# Patient Record
Sex: FEMALE | URBAN - METROPOLITAN AREA
[De-identification: names, ages, dates, MRNs, and addresses within clinical notes are randomized per-mention and may not be internally consistent; named-entity substitution may affect disease eponyms.]

---

## 2022-12-20 ENCOUNTER — OFFICE VISIT (OUTPATIENT)
Facility: LOCATION | Age: 67
End: 2022-12-20
Payer: COMMERCIAL

## 2022-12-20 DIAGNOSIS — H25.813 COMBINED FORMS OF AGE-RELATED CATARACT, BILATERAL: ICD-10-CM

## 2022-12-20 DIAGNOSIS — E11.9 TYPE 2 DIABETES MELLITUS W/O COMPLICATION: Primary | ICD-10-CM

## 2022-12-20 DIAGNOSIS — H16.223 KERATOCONJUNCTIVITIS SICCA, BILATERAL: ICD-10-CM

## 2022-12-20 PROCEDURE — 92134 CPTRZ OPH DX IMG PST SGM RTA: CPT

## 2022-12-20 PROCEDURE — 99204 OFFICE O/P NEW MOD 45 MIN: CPT

## 2022-12-20 ASSESSMENT — INTRAOCULAR PRESSURE
OS: 15
OD: 14

## 2022-12-20 ASSESSMENT — VISUAL ACUITY
OD: 20/25
OS: 20/30

## 2022-12-20 ASSESSMENT — KERATOMETRY
OS: 44.38
OD: 45.25

## 2022-12-20 NOTE — IMPRESSION/PLAN
Impression: Combined forms of age-related cataract, bilateral: H25.813. Plan: Patient educated on condition. Cataract surgery not indicated at this time. Sunglasses advised. Notify clinic if activities of daily life are affected. Patient expressed understanding. Monitor annually; or sooner prn.

## 2022-12-20 NOTE — IMPRESSION/PLAN
Impression: Keratoconjunctivitis sicca, bilateral: Q94.743. Plan: Pt educated on the chronic nature of condition. Recommended artificial tears QID to prn OU. Instructed patient to notify clinic if no improvement in symptoms. Monitor annually.

## 2022-12-20 NOTE — IMPRESSION/PLAN
Impression: Type 2 diabetes mellitus w/o complication: T93.9. Plan: Pt educated on stable findings. Stressed importance of BS/BP control and continued care with PCP/endocrinologist. Will write letter to PCP/endocrinologist regarding exam findings.  RTC 1-year for annual diabetic eye exam.